# Patient Record
Sex: MALE | Race: WHITE
[De-identification: names, ages, dates, MRNs, and addresses within clinical notes are randomized per-mention and may not be internally consistent; named-entity substitution may affect disease eponyms.]

---

## 2021-01-24 ENCOUNTER — HOSPITAL ENCOUNTER (EMERGENCY)
Dept: HOSPITAL 7 - FB.ED | Age: 36
Discharge: HOME | End: 2021-01-24
Payer: MEDICAID

## 2021-01-24 VITALS — SYSTOLIC BLOOD PRESSURE: 122 MMHG | HEART RATE: 81 BPM | DIASTOLIC BLOOD PRESSURE: 74 MMHG

## 2021-01-24 DIAGNOSIS — S16.1XXA: ICD-10-CM

## 2021-01-24 DIAGNOSIS — S00.81XA: ICD-10-CM

## 2021-01-24 DIAGNOSIS — S09.90XA: Primary | ICD-10-CM
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
## 2021-08-30 NOTE — EDM.PDOC
ED HPI GENERAL MEDICAL PROBLEM





- General


Chief Complaint: Abdominal Pain


Stated Complaint: PAIN IN HIS SIDE


Time Seen by Provider: 08/30/21 04:35


Source of Information: Reports: Patient


History Limitations: Reports: No Limitations





- History of Present Illness


INITIAL COMMENTS - FREE TEXT/NARRATIVE: 





Patient presented to the ED because of rt sided abdominal pain which started at 

about 0200. The pain is sharp, squeezing,8/10 with associated N/V/D x 2. there 

is no fever, chills, he rate his pain 8/10. there is no fever, chills, cough or 

cold.





- Related Data


                                    Allergies











Allergy/AdvReac Type Severity Reaction Status Date / Time


 


No Known Allergies Allergy   Verified 08/31/21 07:47











Home Meds: 


                                    Home Meds





DULoxetine [Cymbalta] 60 mg PO DAILY 08/31/21 [History]











Past Medical History


HEENT History: Reports: Other (See Below)


Other HEENT History: Tooth abscess.


Gastrointestinal History: Reports: Cholelithiasis


Genitourinary History: Reports: Renal Calculus


Endocrine/Metabolic History: Reports: Obesity/BMI 30+





- Infectious Disease History


Infectious Disease History: Reports: Chicken Pox





- Past Surgical History


HEENT Surgical History: Reports: Oral Surgery


GI Surgical History: Reports: Cholecystectomy





Social & Family History





- Family History


Family Medical History: Unobtainable





- Caffeine Use


Caffeine Use: Reports: Coffee, Energy Drinks





ED ROS GENERAL





- Review of Systems


Review Of Systems: See Below


Constitutional: Reports: No Symptoms


HEENT: Reports: No Symptoms


Respiratory: Reports: No Symptoms


Cardiovascular: Reports: No Symptoms


Endocrine: Reports: No Symptoms


GI/Abdominal: Reports: Abdominal Pain, Diarrhea, Nausea, Vomiting


: Reports: No Symptoms


Musculoskeletal: Reports: No Symptoms


Skin: Reports: No Symptoms


Neurological: Reports: No Symptoms


Psychiatric: Reports: No Symptoms





ED EXAM, GI/ABD





- Physical Exam


Exam: See Below


Exam Limited By: No Limitations


General Appearance: Alert, No Apparent Distress


Ears: Normal External Exam, Normal Canal


Nose: Normal Inspection, Normal Mucosa, No Blood


Throat/Mouth: Normal Inspection, Normal Lips, Normal Teeth, Normal Gums


Head: Atraumatic, Normocephalic


Neck: Normal Inspection, Supple, Non-Tender, Full Range of Motion


Respiratory/Chest: No Respiratory Distress, Lungs Clear, Normal Breath Sounds


Cardiovascular: Normal Peripheral Pulses, Regular Rate, Rhythm, No Edema, No 

Gallop, No JVD, No Murmur, No Rub


GI/Abdominal Exam: Normal Bowel Sounds, Soft, No Organomegaly, Other (tendernes 

over the RLQ)


Back Exam: Normal Inspection, Full Range of Motion


Extremities: Normal Inspection, Normal Range of Motion, Non-Tender


Neurological: Alert, Oriented, CN II-XII Intact, Normal Cognition, Normal Gait


Psychiatric: Normal Affect





Course





- Vital Signs


Text/Narrative:: 





Lab and CT abd-pelvis result was reviewed and discussed with patient


NS 1 L bolus


Zofran 4 mg IV x1


Toradol 30 mg IV x1


Morphine 2 mg IV x1


Last Recorded V/S: 


                                Last Vital Signs











Temp  36.9 C   08/30/21 06:40


 


Pulse  73   08/30/21 06:40


 


Resp  18   08/30/21 06:40


 


BP  145/87 H  08/30/21 06:40


 


Pulse Ox  96   08/30/21 06:40














- Orders/Labs/Meds


Labs: 


                                Laboratory Tests











  08/30/21 08/30/21 08/30/21 Range/Units





  04:54 04:54 04:54 


 


WBC  9.5    (3.2-10.1)  x10-3/uL


 


RBC  5.17    (3.90-5.90)  x10(6)uL


 


Hgb  16.0    (12.9-17.7)  g/dL


 


Hct  47.0    (38.3-50.1)  %


 


MCV  90.8    (80.8-98.7)  fL


 


MCH  31.0    (27.0-33.3)  pg


 


MCHC  34.1    (28.7-35.3)  g/dL


 


RDW  12.8    (12.4-15.0)  %


 


Plt Count  217    (117-477)  x10(3)uL


 


MPV  7.4    (6.7-11.0)  fL


 


Neut % (Auto)  58.2    (40.3-71.8)  %


 


Lymph % (Auto)  27.3    (15.8-45.3)  %


 


Mono % (Auto)  10.6    (5.5-15.2)  %


 


Eos % (Auto)  3.5    (0.1-6.8)  %


 


Baso % (Auto)  0.4    (0.3-3.8)  %


 


Neut # (Auto)  5.5    (1.7-6.9)  x10-3/uL


 


Lymph # (Auto)  2.6    (0.5-4.5)  x10-3/uL


 


Mono # (Auto)  1.0    (0.0-1.2)  x10-3/uL


 


Eos # (Auto)  0.3    (0.0-0.6)  x10-3/uL


 


Baso # (Auto)  0.0    (0.0-0.3)  x10-3/uL


 


Sodium   144   (135-145)  mmol/L


 


Potassium   3.6   (3.5-5.3)  mmol/L


 


Chloride   107   (100-110)  mmol/L


 


Carbon Dioxide   27   (21-32)  mmol/L


 


BUN   16   (7-18)  mg/dL


 


Creatinine   1.2   (0.70-1.30)  mg/dL


 


Est Cr Clr Drug Dosing   TNP   


 


Estimated GFR (MDRD)   > 60   (>60)  


 


BUN/Creatinine Ratio   13.3   (9-20)  


 


Glucose   138 H   ()  mg/dL


 


Calcium   8.4 L   (8.6-10.2)  mg/dL


 


Total Bilirubin   0.5   (0.1-1.3)  mg/dL


 


AST   17   (5-25)  IU/L


 


ALT   40 H   (12-36)  U/L


 


Alkaline Phosphatase   66   ()  IU/L


 


Total Protein   6.3   (6.0-8.0)  g/dL


 


Albumin   3.3 L   (3.5-5.2)  g/dL


 


Globulin   3.0   g/dL


 


Albumin/Globulin Ratio   1.1   


 


Amylase   37   ()  U/L


 


Lipase    200  ()  U/L


 


Urine Color     (YELLOW)  


 


Urine Appearance     (CLEAR)  


 


Urine pH     (5.0-6.5)  


 


Ur Specific Gravity     (1.010-1.025)  


 


Urine Protein     (NEGATIVE)  mg/dL


 


Urine Glucose (UA)     (NORMAL)  mg/dL


 


Urine Ketones     (NEGATIVE)  mg/dL


 


Urine Occult Blood     (NEGATIVE)  


 


Urine Nitrite     (NEGATIVE)  


 


Urine Bilirubin     (NEGATIVE)  


 


Urine Urobilinogen     (NEGATIVE)  mg/dL


 


Ur Leukocyte Esterase     (NEGATIVE)  


 


Urine RBC     (0-5)  


 


Urine WBC     (0-5)  


 


Ur Squamous Epith Cells     (NS,R,O)  


 


Urine Bacteria     (NS)  


 


Urine Mucus     (NS)  














  08/30/21 Range/Units





  06:30 


 


WBC   (3.2-10.1)  x10-3/uL


 


RBC   (3.90-5.90)  x10(6)uL


 


Hgb   (12.9-17.7)  g/dL


 


Hct   (38.3-50.1)  %


 


MCV   (80.8-98.7)  fL


 


MCH   (27.0-33.3)  pg


 


MCHC   (28.7-35.3)  g/dL


 


RDW   (12.4-15.0)  %


 


Plt Count   (117-477)  x10(3)uL


 


MPV   (6.7-11.0)  fL


 


Neut % (Auto)   (40.3-71.8)  %


 


Lymph % (Auto)   (15.8-45.3)  %


 


Mono % (Auto)   (5.5-15.2)  %


 


Eos % (Auto)   (0.1-6.8)  %


 


Baso % (Auto)   (0.3-3.8)  %


 


Neut # (Auto)   (1.7-6.9)  x10-3/uL


 


Lymph # (Auto)   (0.5-4.5)  x10-3/uL


 


Mono # (Auto)   (0.0-1.2)  x10-3/uL


 


Eos # (Auto)   (0.0-0.6)  x10-3/uL


 


Baso # (Auto)   (0.0-0.3)  x10-3/uL


 


Sodium   (135-145)  mmol/L


 


Potassium   (3.5-5.3)  mmol/L


 


Chloride   (100-110)  mmol/L


 


Carbon Dioxide   (21-32)  mmol/L


 


BUN   (7-18)  mg/dL


 


Creatinine   (0.70-1.30)  mg/dL


 


Est Cr Clr Drug Dosing   


 


Estimated GFR (MDRD)   (>60)  


 


BUN/Creatinine Ratio   (9-20)  


 


Glucose   ()  mg/dL


 


Calcium   (8.6-10.2)  mg/dL


 


Total Bilirubin   (0.1-1.3)  mg/dL


 


AST   (5-25)  IU/L


 


ALT   (12-36)  U/L


 


Alkaline Phosphatase   ()  IU/L


 


Total Protein   (6.0-8.0)  g/dL


 


Albumin   (3.5-5.2)  g/dL


 


Globulin   g/dL


 


Albumin/Globulin Ratio   


 


Amylase   ()  U/L


 


Lipase   ()  U/L


 


Urine Color  Yellow  (YELLOW)  


 


Urine Appearance  Slightly cloudy  (CLEAR)  


 


Urine pH  6.5  (5.0-6.5)  


 


Ur Specific Gravity  1.015  (1.010-1.025)  


 


Urine Protein  Trace  (NEGATIVE)  mg/dL


 


Urine Glucose (UA)  Normal  (NORMAL)  mg/dL


 


Urine Ketones  Negative  (NEGATIVE)  mg/dL


 


Urine Occult Blood  Large H  (NEGATIVE)  


 


Urine Nitrite  Negative  (NEGATIVE)  


 


Urine Bilirubin  Negative  (NEGATIVE)  


 


Urine Urobilinogen  1 H  (NEGATIVE)  mg/dL


 


Ur Leukocyte Esterase  Small H  (NEGATIVE)  


 


Urine RBC  20-30 H  (0-5)  


 


Urine WBC  0-5  (0-5)  


 


Ur Squamous Epith Cells  Occasional  (NS,R,O)  


 


Urine Bacteria  Few H  (NS)  


 


Urine Mucus  Few H  (NS)  











Meds: 


Medications














Discontinued Medications














Generic Name Dose Route Start Last Admin





  Trade Name Freq  PRN Reason Stop Dose Admin


 


Sodium Chloride  1,000 mls @ 999 mls/hr  08/30/21 04:45  08/30/21 05:00





  Normal Saline  IV   999 mls/hr





  ASDIRECTED WARD   Administration


 


Iopamidol  100 ml  08/30/21 05:14  08/30/21 05:22





  Iopamidol 755 Mg/Ml 100 Ml Bottle  IV  08/30/21 05:15  100 ml





  .AS DIRECTED ONE   Administration


 


Ketorolac Tromethamine  30 mg  08/30/21 04:40  08/30/21 05:07





  Ketorolac 30 Mg/Ml Sdv  IVPUSH  08/30/21 04:41  30 mg





  NOW STA   Administration


 


Morphine Sulfate  2 mg  08/30/21 04:40  08/30/21 05:11





  Morphine 2 Mg/Ml Syringe  IVPUSH  08/30/21 04:41  2 mg





  NOW STA   Administration


 


Ondansetron HCl  4 mg  08/30/21 04:40  08/30/21 05:01





  Ondansetron 4 Mg/2 Ml Sdv  IVPUSH  08/30/21 04:41  4 mg





  NOW STA   Administration


 


Sodium Chloride  10 ml  08/30/21 04:38 





  Sodium Chloride 0.9% 10 Ml Syringe  FLUSH  





  ASDIRECTED PRN  





  Keep Vein Open  














Departure





- Departure


Time of Disposition: 06:45


Disposition: Home, Self-Care 01


Condition: Good


Clinical Impression: 


 Gastroenteritis, Abdominal pain








- Discharge Information


Instructions:  Viral Gastroenteritis, Adult, Easy-to-Read


Referrals: 


PCP,None [Primary Care Provider] - 


Forms:  ED Department Discharge


Additional Instructions: 


Please read discharge instructions om stomach flu


Frequent hand washing drink at least 2 liters of water daily


Imodium 2 tablets every 6 hours as needed for diarrhea


Follow p as needed